# Patient Record
Sex: FEMALE | Race: WHITE | NOT HISPANIC OR LATINO | Employment: OTHER | URBAN - METROPOLITAN AREA
[De-identification: names, ages, dates, MRNs, and addresses within clinical notes are randomized per-mention and may not be internally consistent; named-entity substitution may affect disease eponyms.]

---

## 2018-03-20 ENCOUNTER — APPOINTMENT (OUTPATIENT)
Dept: GENERAL RADIOLOGY | Facility: HOSPITAL | Age: 68
End: 2018-03-20

## 2018-03-20 ENCOUNTER — HOSPITAL ENCOUNTER (OUTPATIENT)
Facility: HOSPITAL | Age: 68
Setting detail: HOSPITAL OUTPATIENT SURGERY
Discharge: HOME | End: 2018-03-20
Attending: ORTHOPAEDIC SURGERY | Admitting: ORTHOPAEDIC SURGERY

## 2018-03-20 ENCOUNTER — ANESTHESIA EVENT (OUTPATIENT)
Dept: PERIOP | Facility: HOSPITAL | Age: 68
End: 2018-03-20

## 2018-03-20 ENCOUNTER — ANESTHESIA (OUTPATIENT)
Dept: PERIOP | Facility: HOSPITAL | Age: 68
End: 2018-03-20

## 2018-03-20 VITALS
HEIGHT: 65 IN | HEART RATE: 77 BPM | BODY MASS INDEX: 28.8 KG/M2 | DIASTOLIC BLOOD PRESSURE: 70 MMHG | OXYGEN SATURATION: 93 % | SYSTOLIC BLOOD PRESSURE: 137 MMHG | TEMPERATURE: 98.3 F | RESPIRATION RATE: 16 BRPM | WEIGHT: 172.84 LBS

## 2018-03-20 PROBLEM — S52.501A CLOSED FRACTURE OF RIGHT DISTAL RADIUS: Status: ACTIVE | Noted: 2018-03-20

## 2018-03-20 LAB
ANION GAP SERPL CALCULATED.3IONS-SCNC: 8.4 MMOL/L
BUN BLD-MCNC: 12 MG/DL (ref 8–23)
BUN/CREAT SERPL: 16.2 (ref 7–25)
CALCIUM SPEC-SCNC: 9.2 MG/DL (ref 8.6–10.5)
CHLORIDE SERPL-SCNC: 103 MMOL/L (ref 98–107)
CO2 SERPL-SCNC: 28.6 MMOL/L (ref 22–29)
CREAT BLD-MCNC: 0.74 MG/DL (ref 0.57–1)
DEPRECATED RDW RBC AUTO: 46.1 FL (ref 37–54)
ERYTHROCYTE [DISTWIDTH] IN BLOOD BY AUTOMATED COUNT: 14.6 % (ref 11.7–13)
GFR SERPL CREATININE-BSD FRML MDRD: 79 ML/MIN/1.73
GLUCOSE BLD-MCNC: 95 MG/DL (ref 65–99)
HCT VFR BLD AUTO: 38.4 % (ref 35.6–45.5)
HGB BLD-MCNC: 11.9 G/DL (ref 11.9–15.5)
MCH RBC QN AUTO: 26.6 PG (ref 26.9–32)
MCHC RBC AUTO-ENTMCNC: 31 G/DL (ref 32.4–36.3)
MCV RBC AUTO: 85.7 FL (ref 80.5–98.2)
PLATELET # BLD AUTO: 220 10*3/MM3 (ref 140–500)
PMV BLD AUTO: 10 FL (ref 6–12)
POTASSIUM BLD-SCNC: 4.7 MMOL/L (ref 3.5–5.2)
RBC # BLD AUTO: 4.48 10*6/MM3 (ref 3.9–5.2)
SODIUM BLD-SCNC: 140 MMOL/L (ref 136–145)
WBC NRBC COR # BLD: 7.9 10*3/MM3 (ref 4.5–10.7)

## 2018-03-20 PROCEDURE — C1713 ANCHOR/SCREW BN/BN,TIS/BN: HCPCS | Performed by: ORTHOPAEDIC SURGERY

## 2018-03-20 PROCEDURE — 25010000002 PROPOFOL 10 MG/ML EMULSION: Performed by: NURSE ANESTHETIST, CERTIFIED REGISTERED

## 2018-03-20 PROCEDURE — 85027 COMPLETE CBC AUTOMATED: CPT | Performed by: ORTHOPAEDIC SURGERY

## 2018-03-20 PROCEDURE — 76000 FLUOROSCOPY <1 HR PHYS/QHP: CPT

## 2018-03-20 PROCEDURE — 25010000002 MEPIVACAINE PF 2 % SOLUTION 20 ML VIAL: Performed by: ANESTHESIOLOGY

## 2018-03-20 PROCEDURE — 73110 X-RAY EXAM OF WRIST: CPT

## 2018-03-20 PROCEDURE — 25010000002 FENTANYL CITRATE (PF) 100 MCG/2ML SOLUTION: Performed by: NURSE ANESTHETIST, CERTIFIED REGISTERED

## 2018-03-20 PROCEDURE — 25010000002 ONDANSETRON PER 1 MG: Performed by: NURSE ANESTHETIST, CERTIFIED REGISTERED

## 2018-03-20 PROCEDURE — 25010000002 MIDAZOLAM PER 1 MG: Performed by: ANESTHESIOLOGY

## 2018-03-20 PROCEDURE — 80048 BASIC METABOLIC PNL TOTAL CA: CPT | Performed by: ORTHOPAEDIC SURGERY

## 2018-03-20 PROCEDURE — 25010000002 ROPIVACAINE PER 1 MG: Performed by: ANESTHESIOLOGY

## 2018-03-20 PROCEDURE — 25010000003 CEFAZOLIN 1 GM/50ML SOLUTION: Performed by: ORTHOPAEDIC SURGERY

## 2018-03-20 PROCEDURE — 25010000002 FENTANYL CITRATE (PF) 100 MCG/2ML SOLUTION: Performed by: ANESTHESIOLOGY

## 2018-03-20 PROCEDURE — 25010000002 METHYLPREDNISOLONE PER 125 MG: Performed by: ANESTHESIOLOGY

## 2018-03-20 DEVICE — IMPLANTABLE DEVICE: Type: IMPLANTABLE DEVICE | Site: RADIUS | Status: FUNCTIONAL

## 2018-03-20 DEVICE — SCRW DVR NL LP 2.7X15MM: Type: IMPLANTABLE DEVICE | Site: RADIUS | Status: FUNCTIONAL

## 2018-03-20 DEVICE — SCRW DVR LK 2.7X15MM: Type: IMPLANTABLE DEVICE | Site: RADIUS | Status: FUNCTIONAL

## 2018-03-20 RX ORDER — PROPOFOL 10 MG/ML
VIAL (ML) INTRAVENOUS AS NEEDED
Status: DISCONTINUED | OUTPATIENT
Start: 2018-03-20 | End: 2018-03-20 | Stop reason: SURG

## 2018-03-20 RX ORDER — HYDRALAZINE HYDROCHLORIDE 20 MG/ML
5 INJECTION INTRAMUSCULAR; INTRAVENOUS
Status: DISCONTINUED | OUTPATIENT
Start: 2018-03-20 | End: 2018-03-20 | Stop reason: HOSPADM

## 2018-03-20 RX ORDER — ROPIVACAINE HYDROCHLORIDE 5 MG/ML
INJECTION, SOLUTION EPIDURAL; INFILTRATION; PERINEURAL AS NEEDED
Status: DISCONTINUED | OUTPATIENT
Start: 2018-03-20 | End: 2018-03-20 | Stop reason: SURG

## 2018-03-20 RX ORDER — ASPIRIN 81 MG/1
81 TABLET, DELAYED RELEASE ORAL DAILY
COMMUNITY

## 2018-03-20 RX ORDER — EPHEDRINE SULFATE 50 MG/ML
5 INJECTION, SOLUTION INTRAVENOUS ONCE AS NEEDED
Status: DISCONTINUED | OUTPATIENT
Start: 2018-03-20 | End: 2018-03-20 | Stop reason: HOSPADM

## 2018-03-20 RX ORDER — METHIMAZOLE 5 MG/1
5 TABLET ORAL DAILY
COMMUNITY

## 2018-03-20 RX ORDER — FENTANYL CITRATE 50 UG/ML
INJECTION, SOLUTION INTRAMUSCULAR; INTRAVENOUS AS NEEDED
Status: DISCONTINUED | OUTPATIENT
Start: 2018-03-20 | End: 2018-03-20 | Stop reason: SURG

## 2018-03-20 RX ORDER — SODIUM CHLORIDE, SODIUM LACTATE, POTASSIUM CHLORIDE, CALCIUM CHLORIDE 600; 310; 30; 20 MG/100ML; MG/100ML; MG/100ML; MG/100ML
9 INJECTION, SOLUTION INTRAVENOUS CONTINUOUS
Status: DISCONTINUED | OUTPATIENT
Start: 2018-03-20 | End: 2018-03-20 | Stop reason: HOSPADM

## 2018-03-20 RX ORDER — POTASSIUM CHLORIDE 750 MG/1
10 TABLET, FILM COATED, EXTENDED RELEASE ORAL 2 TIMES DAILY WITH MEALS
COMMUNITY

## 2018-03-20 RX ORDER — MORPHINE SULFATE 15 MG/1
15 TABLET, FILM COATED, EXTENDED RELEASE ORAL 3 TIMES DAILY
COMMUNITY

## 2018-03-20 RX ORDER — SODIUM CHLORIDE 0.9 % (FLUSH) 0.9 %
1-10 SYRINGE (ML) INJECTION AS NEEDED
Status: DISCONTINUED | OUTPATIENT
Start: 2018-03-20 | End: 2018-03-20 | Stop reason: HOSPADM

## 2018-03-20 RX ORDER — METOPROLOL SUCCINATE 25 MG/1
25 TABLET, EXTENDED RELEASE ORAL 2 TIMES DAILY
COMMUNITY

## 2018-03-20 RX ORDER — HYDROCODONE BITARTRATE AND ACETAMINOPHEN 10; 325 MG/1; MG/1
1 TABLET ORAL EVERY 6 HOURS PRN
Qty: 15 TABLET | Refills: 0 | Status: SHIPPED | OUTPATIENT
Start: 2018-03-20

## 2018-03-20 RX ORDER — MIDAZOLAM HYDROCHLORIDE 1 MG/ML
2 INJECTION INTRAMUSCULAR; INTRAVENOUS
Status: COMPLETED | OUTPATIENT
Start: 2018-03-20 | End: 2018-03-20

## 2018-03-20 RX ORDER — NALOXONE HCL 0.4 MG/ML
0.2 VIAL (ML) INJECTION AS NEEDED
Status: DISCONTINUED | OUTPATIENT
Start: 2018-03-20 | End: 2018-03-20 | Stop reason: HOSPADM

## 2018-03-20 RX ORDER — DIPHENHYDRAMINE HYDROCHLORIDE 50 MG/ML
12.5 INJECTION INTRAMUSCULAR; INTRAVENOUS
Status: DISCONTINUED | OUTPATIENT
Start: 2018-03-20 | End: 2018-03-20 | Stop reason: HOSPADM

## 2018-03-20 RX ORDER — FENTANYL CITRATE 50 UG/ML
50 INJECTION, SOLUTION INTRAMUSCULAR; INTRAVENOUS
Status: COMPLETED | OUTPATIENT
Start: 2018-03-20 | End: 2018-03-20

## 2018-03-20 RX ORDER — MORPHINE SULFATE 30 MG/1
30 TABLET, FILM COATED, EXTENDED RELEASE ORAL 3 TIMES DAILY
COMMUNITY

## 2018-03-20 RX ORDER — LIDOCAINE HYDROCHLORIDE 20 MG/ML
INJECTION, SOLUTION INFILTRATION; PERINEURAL AS NEEDED
Status: DISCONTINUED | OUTPATIENT
Start: 2018-03-20 | End: 2018-03-20 | Stop reason: SURG

## 2018-03-20 RX ORDER — ONDANSETRON 2 MG/ML
INJECTION INTRAMUSCULAR; INTRAVENOUS AS NEEDED
Status: DISCONTINUED | OUTPATIENT
Start: 2018-03-20 | End: 2018-03-20 | Stop reason: SURG

## 2018-03-20 RX ORDER — FLUMAZENIL 0.1 MG/ML
0.2 INJECTION INTRAVENOUS AS NEEDED
Status: DISCONTINUED | OUTPATIENT
Start: 2018-03-20 | End: 2018-03-20 | Stop reason: HOSPADM

## 2018-03-20 RX ORDER — OXYCODONE AND ACETAMINOPHEN 7.5; 325 MG/1; MG/1
1 TABLET ORAL ONCE AS NEEDED
Status: DISCONTINUED | OUTPATIENT
Start: 2018-03-20 | End: 2018-03-20 | Stop reason: HOSPADM

## 2018-03-20 RX ORDER — GABAPENTIN 800 MG/1
800 TABLET ORAL 3 TIMES DAILY
COMMUNITY

## 2018-03-20 RX ORDER — FAMOTIDINE 10 MG/ML
20 INJECTION, SOLUTION INTRAVENOUS ONCE
Status: COMPLETED | OUTPATIENT
Start: 2018-03-20 | End: 2018-03-20

## 2018-03-20 RX ORDER — ONDANSETRON 2 MG/ML
4 INJECTION INTRAMUSCULAR; INTRAVENOUS ONCE AS NEEDED
Status: DISCONTINUED | OUTPATIENT
Start: 2018-03-20 | End: 2018-03-20 | Stop reason: HOSPADM

## 2018-03-20 RX ORDER — HYDROCODONE BITARTRATE AND ACETAMINOPHEN 7.5; 325 MG/1; MG/1
1 TABLET ORAL ONCE AS NEEDED
Status: COMPLETED | OUTPATIENT
Start: 2018-03-20 | End: 2018-03-20

## 2018-03-20 RX ORDER — LABETALOL HYDROCHLORIDE 5 MG/ML
5 INJECTION, SOLUTION INTRAVENOUS
Status: DISCONTINUED | OUTPATIENT
Start: 2018-03-20 | End: 2018-03-20 | Stop reason: HOSPADM

## 2018-03-20 RX ORDER — METHYLPREDNISOLONE SODIUM SUCCINATE 125 MG/2ML
INJECTION, POWDER, LYOPHILIZED, FOR SOLUTION INTRAMUSCULAR; INTRAVENOUS AS NEEDED
Status: DISCONTINUED | OUTPATIENT
Start: 2018-03-20 | End: 2018-03-20 | Stop reason: SURG

## 2018-03-20 RX ORDER — FENTANYL CITRATE 50 UG/ML
50 INJECTION, SOLUTION INTRAMUSCULAR; INTRAVENOUS
Status: DISCONTINUED | OUTPATIENT
Start: 2018-03-20 | End: 2018-03-20 | Stop reason: HOSPADM

## 2018-03-20 RX ORDER — EPHEDRINE SULFATE 50 MG/ML
INJECTION, SOLUTION INTRAVENOUS AS NEEDED
Status: DISCONTINUED | OUTPATIENT
Start: 2018-03-20 | End: 2018-03-20 | Stop reason: SURG

## 2018-03-20 RX ADMIN — LIDOCAINE HYDROCHLORIDE 100 MG: 20 INJECTION, SOLUTION INFILTRATION; PERINEURAL at 14:10

## 2018-03-20 RX ADMIN — FAMOTIDINE 20 MG: 10 INJECTION INTRAVENOUS at 13:08

## 2018-03-20 RX ADMIN — MIDAZOLAM 2 MG: 1 INJECTION INTRAMUSCULAR; INTRAVENOUS at 13:44

## 2018-03-20 RX ADMIN — CEFAZOLIN SODIUM 1 G: 1 INJECTION, SOLUTION INTRAVENOUS at 14:18

## 2018-03-20 RX ADMIN — FENTANYL CITRATE 50 MCG: 50 INJECTION INTRAMUSCULAR; INTRAVENOUS at 13:45

## 2018-03-20 RX ADMIN — PROPOFOL 200 MG: 10 INJECTION, EMULSION INTRAVENOUS at 14:10

## 2018-03-20 RX ADMIN — HYDROCODONE BITARTRATE AND ACETAMINOPHEN 1 TABLET: 7.5; 325 TABLET ORAL at 15:55

## 2018-03-20 RX ADMIN — SODIUM CHLORIDE, POTASSIUM CHLORIDE, SODIUM LACTATE AND CALCIUM CHLORIDE: 600; 310; 30; 20 INJECTION, SOLUTION INTRAVENOUS at 14:06

## 2018-03-20 RX ADMIN — FENTANYL CITRATE 50 MCG: 50 INJECTION INTRAMUSCULAR; INTRAVENOUS at 15:09

## 2018-03-20 RX ADMIN — ONDANSETRON 4 MG: 2 INJECTION INTRAMUSCULAR; INTRAVENOUS at 15:01

## 2018-03-20 RX ADMIN — FENTANYL CITRATE 25 MCG: 50 INJECTION INTRAMUSCULAR; INTRAVENOUS at 14:44

## 2018-03-20 RX ADMIN — EPHEDRINE SULFATE 10 MG: 50 INJECTION INTRAMUSCULAR; INTRAVENOUS; SUBCUTANEOUS at 14:42

## 2018-03-20 RX ADMIN — FENTANYL CITRATE 50 MCG: 50 INJECTION, SOLUTION INTRAMUSCULAR; INTRAVENOUS at 15:53

## 2018-03-20 RX ADMIN — METHYLPREDNISOLONE SODIUM SUCCINATE 80 MG: 125 INJECTION, POWDER, FOR SOLUTION INTRAMUSCULAR; INTRAVENOUS at 13:34

## 2018-03-20 RX ADMIN — MIDAZOLAM 2 MG: 1 INJECTION INTRAMUSCULAR; INTRAVENOUS at 13:08

## 2018-03-20 RX ADMIN — FENTANYL CITRATE 25 MCG: 50 INJECTION INTRAMUSCULAR; INTRAVENOUS at 15:02

## 2018-03-20 RX ADMIN — ROPIVACAINE HYDROCHLORIDE 20 ML: 5 INJECTION, SOLUTION EPIDURAL; INFILTRATION; PERINEURAL at 13:34

## 2018-03-20 RX ADMIN — MEPIVACAINE HYDROCHLORIDE 10 ML: 20 INJECTION, SOLUTION EPIDURAL; INFILTRATION at 13:34

## 2018-03-20 RX ADMIN — FENTANYL CITRATE 50 MCG: 50 INJECTION INTRAMUSCULAR; INTRAVENOUS at 13:49

## 2018-03-20 NOTE — ANESTHESIA PROCEDURE NOTES
Airway  Urgency: elective    Date/Time: 3/20/2018 2:12 PM  Airway not difficult    General Information and Staff    Patient location during procedure: OR  Anesthesiologist: RASTA CARMEN  CRNA: MARLO CRAIG    Indications and Patient Condition  Indications for airway management: airway protection    Preoxygenated: yes  MILS maintained throughout  Mask difficulty assessment: 1 - vent by mask    Final Airway Details  Final airway type: supraglottic airway      Successful airway: unique  Size 4    Number of attempts at approach: 1

## 2018-03-20 NOTE — ANESTHESIA PROCEDURE NOTES
Peripheral Block    Patient location during procedure: pre-op  Start time: 3/20/2018 1:36 PM  Stop time: 3/20/2018 1:46 PM  Reason for block: at surgeon's request and post-op pain management  Performed by  Anesthesiologist: RASTA LAU  Preanesthetic Checklist  Completed: patient identified, site marked, surgical consent, pre-op evaluation, timeout performed, IV checked, risks and benefits discussed and monitors and equipment checked  Prep:  Sterile barriers:cap, gloves, gown, mask and sterile barriers  Prep: ChloraPrep  Patient monitoring: blood pressure monitoring, continuous pulse oximetry and EKG  Procedure  Sedation:yes    Guidance:ultrasound guided  ULTRASOUND INTERPRETATION.  Using ultrasound guidance a 21 G gauge needle was placed in close proximity to the brachial plexus nerve, at which point, under ultrasound guidance anesthetic was injected in the area of the nerve and spread of the anesthesia was seen on ultrasound in close proximity thereto.  There were no abnormalities seen on ultrasound; a digital image was taken; and the patient tolerated the procedure with no complications. Images:still images obtained    Laterality:right  Block Type:interscalene and supraclavicular  Injection Technique:single-shot  Needle Type:echogenic  Needle Gauge:21 G    Medications  Depomedrol:80 mg  Local Injected:ropivacaine 0.5% and 2% Mepivacaine  Post Assessment  Injection Assessment: negative aspiration for heme, no paresthesia on injection and incremental injection  Patient Tolerance:comfortable throughout block  Complications:no

## 2018-03-20 NOTE — ANESTHESIA PREPROCEDURE EVALUATION
Anesthesia Evaluation     Patient summary reviewed and Nursing notes reviewed   no history of anesthetic complications:  NPO Solid Status: > 8 hours  NPO Liquid Status: > 2 hours           Airway   Mallampati: II  Dental    (+) upper dentures    Pulmonary - negative pulmonary ROS and normal exam   Cardiovascular - normal exam    (+) hypertension well controlled, CAD, murmur,     ROS comment: S/P MVRepair    Neuro/Psych- negative ROS  GI/Hepatic/Renal/Endo - negative ROS     Musculoskeletal     Abdominal    Substance History      OB/GYN          Other                        Anesthesia Plan    ASA 3     general   (Supraclav block)  intravenous induction   Anesthetic plan and risks discussed with patient.

## 2018-03-20 NOTE — ANESTHESIA POSTPROCEDURE EVALUATION
Patient: Renée Rouse    Procedure Summary     Date:  03/20/18 Room / Location:  [facility] OR [address] [place] [address] MAIN OR    Anesthesia Start:  1406 Anesthesia Stop:  1518    Procedure:  OPEN REDUCTION INTERAL FIXATION RIGHT DISTAL RADIUS (Right Wrist) Diagnosis:      Surgeon:  Ezra Varner MD Provider:  Judson Conner MD    Anesthesia Type:  general ASA Status:  3          Anesthesia Type: general  Last vitals  BP   139/71 (03/20/18 1540)   Temp   36.8 °C (98.3 °F) (03/20/18 1515)   Pulse   75 (03/20/18 1540)   Resp   16 (03/20/18 1525)     SpO2   100 % (03/20/18 1540)     Post Anesthesia Care and Evaluation    Patient location during evaluation: PACU  Patient participation: complete - patient participated  Level of consciousness: awake and alert  Pain management: adequate  Airway patency: patent  Anesthetic complications: No anesthetic complications    Cardiovascular status: acceptable  Respiratory status: acceptable  Hydration status: acceptable    Comments: --------------------            03/20/18               1540     --------------------   BP:       139/71     Pulse:      75       Resp:                Temp:                SpO2:      100%     --------------------

## 2018-03-20 NOTE — OP NOTE
WRIST OPEN REDUCTION INTERNAL FIXATION  Procedure Note    Renée Rouse  3/20/2018    Pre-op Diagnosis: Right  Distal Radius Fracture 2-Part Intra-Articular   Post-op Diagnosis:Same  Procedure:  Open Reduction Internal Fixation of Right 2-Part Intra-Articular Distal Radius Fracture, 51098.  Surgeon:  Ezra Varner MD  Anesthesia: General with Block, Anesthesiologist: Judson Conner MD  CRNA: Shashank Johnson CRNA; Monserrat Hines CRNA  Staff: Circulator: Marjorie Winston RN; Vonnie Quintero RN  Radiology Technologist: Lynn Álvarez, RRT  Scrub Person: Jb Faustin  Assistant: Byron Oquendo DO CFA  Estimated Blood Loss: minimal  Specimens:   Order Name Source Comment Collection Info Order Time   CBC (NO DIFF)   Collected By: Luz Tan RN 3/20/2018 11:38 AM   BASIC METABOLIC PANEL   Collected By: Luz Tan RN 3/20/2018 11:39 AM     Drains: none  Complications: None    Components Utilized:    Hand [employer] Plate Nothing was implanted during the procedure    Indication for Procedure:  The patient is a 67 y.o. female who sustained a distal radius fracture that was significantly displaced.  Surgical options and non-surgical options were discussed in detail and to the patients satisfaction.  Surgical intervention was recommended based on the patients injury and functional status.      The patient was educated in risks of surgery that could include possible risk of fracture malunion, non-union, painful hardware,  infection, deep venous thrombosis, pulmonary embolism, fracture, neurovascular injury, dislocation, possible persistent pain, need for additional surgeries, anesthetic risks, medical risks including heart attack and stroke, and death.  The patient had the opportunity to ask questions, and concerns about the proposed surgery.  The patient also understood that medicine is not an exact science, and that outcomes of the surgical procedure may be less than desired.  The patient wished to proceed.      Protocols for intravenous antibiotics and venous thrombosis were followed for this patient.  IV antibiotics were infused prior to surgery and will be discontinued within 24 hours of completion of the surgical procedure.       DESCRIPTION OF PROCEDURE:   After the patient was identified in the preoperative holding area, the operative site was marked and confirmed. The patient was brought to the operating room on a stretcher and placed supine on the operating table. The above anesthetic was placed uneventfully. The patient's operative arm then had a non-sterile tourniquet placed and was then prepped and drapped in usual sterile fashion.  Time-out procedure was performed and IV antibiotics were infused.  An esmarch was used and the touniquet was inflated.  Then a volar approach of Warren was used with a 15 blade scapel.  The FCR and FPL tendons were mobilized radially.  The pronator quadratusa tendon was mobilized off the volar surface of the radius.  The fracture was encountered.  This was a 2-part fracture with split through the lunate facet. The soft tissue was removed from the fracture.  An open reduction maneuver was performed.  The C-arm was used to confirm reduction of the extra and intra-articular components of the fracture.  A volar plate was applied, and screws were inserted in standard AO fashion, securing the fracture site.  Final C-Arm images were obtained in numerous views which showed good reduction and hardware placement.  The tourniquet was then dropped.  Hemostasis was obtained.  3-0 vicryl was used to close the fascial layer and deep dermis and 3-0 nylon was used to close the skin.  Sterile dressings were applied and a volar splint was placed.  The patient was wakened from anesthesia and returned to recovery room in stable condition. No intraoperative complications.  Sponge and needle counts were correct at the conclusion of the case.      Royal Varner MD

## 2018-03-20 NOTE — BRIEF OP NOTE
WRIST OPEN REDUCTION INTERNAL FIXATION  Progress Note    Renée Rouse  3/20/2018    Pre-op Diagnosis:   Right distal radius fracture       Post-Op Diagnosis Codes:   Same    Procedure/CPT® Codes:      Procedure(s):  OPEN REDUCTION INTERAL FIXATION RIGHT DISTAL RADIUS    Surgeon(s):  Ezra Varner MD    Anesthesia: General with Block    Staff:   Circulator: Marjorie Winston RN; Vonnie Quintero RN  Radiology Technologist: Lynn Álvarez RRT  Scrub Person: Jb Faustin  Assistant: Byron Oquendo DO    Estimated Blood Loss: minimal    Urine Voided: * No values recorded between 3/20/2018  2:04 PM and 3/20/2018  3:05 PM *    Specimens:                None      Drains:      Findings: Fracture    Complications: None      Ezra Varner MD     Date: 3/20/2018  Time: 3:05 PM

## (undated) DEVICE — SUT VIC 0 SH 27IN J418H

## (undated) DEVICE — BNDG ELAS ELITE V/CLOSE 4IN 5YD LF STRL

## (undated) DEVICE — DRSNG PAD ABD 8X10IN STRL

## (undated) DEVICE — T-DRAPE,EXTREMITY,STERILE: Brand: MEDLINE

## (undated) DEVICE — GLV SURG TRIUMPH CLASSIC PF LTX 8 STRL

## (undated) DEVICE — BNDG ESMARK 4IN 12FT LF STRL BLU

## (undated) DEVICE — KWIRE SS 1.6MM NS
Type: IMPLANTABLE DEVICE | Site: RADIUS | Status: NON-FUNCTIONAL
Removed: 2018-03-20

## (undated) DEVICE — ARM SLING: Brand: DEROYAL

## (undated) DEVICE — DRP C/ARM 41X74IN

## (undated) DEVICE — BIT DRL DVR CRSLK TRY 2.2MM

## (undated) DEVICE — DISPOSABLE TOURNIQUET CUFF SINGLE BLADDER, SINGLE PORT AND QUICK CONNECT CONNECTOR: Brand: COLOR CUFF

## (undated) DEVICE — APPL CHLORAPREP W/TINT 26ML ORNG

## (undated) DEVICE — 3M™ IOBAN™ 2 ANTIMICROBIAL INCISE DRAPE 6640EZ: Brand: IOBAN™ 2

## (undated) DEVICE — ANTIBACTERIAL UNDYED BRAIDED (POLYGLACTIN 910), SYNTHETIC ABSORBABLE SUTURE: Brand: COATED VICRYL

## (undated) DEVICE — ENCORE® LATEX ORTHO SIZE 8, STERILE LATEX POWDER-FREE SURGICAL GLOVE: Brand: ENCORE

## (undated) DEVICE — OCCLUSIVE GAUZE STRIP,3% BISMUTH TRIBROMOPHENATE IN PETROLATUM BLEND: Brand: XEROFORM

## (undated) DEVICE — SPNG GZ WOVN 4X4IN 12PLY 10/BX STRL

## (undated) DEVICE — SUT MNCRYL 3/0 PS2 18IN MCP497G

## (undated) DEVICE — PAD GRND REM POLYHESIVE A/ DISP

## (undated) DEVICE — PK ORTHO MAJ 40

## (undated) DEVICE — 3M™ STERI-STRIP™ REINFORCED ADHESIVE SKIN CLOSURES, R1547, 1/2 IN X 4 IN (12 MM X 100 MM), 6 STRIPS/ENVELOPE: Brand: 3M™ STERI-STRIP™

## (undated) DEVICE — UNDERCAST PADDING: Brand: DEROYAL